# Patient Record
Sex: MALE | URBAN - METROPOLITAN AREA
[De-identification: names, ages, dates, MRNs, and addresses within clinical notes are randomized per-mention and may not be internally consistent; named-entity substitution may affect disease eponyms.]

---

## 2022-12-27 ENCOUNTER — OFFICE VISIT (OUTPATIENT)
Dept: URBAN - METROPOLITAN AREA CLINIC 72 | Facility: CLINIC | Age: 62
End: 2022-12-27

## 2022-12-27 NOTE — HPI-TODAY'S VISIT:
Mr. Graham is a 62-year-old male returns for follow-up for evaluation of posthospitalization was hospitalized in September with "GI bleed" evidently was on Xarelto for atrial fibrillation.  He also has a history of cirrhosis presumably related to alcohol use.  Colonoscopy was performed by me which did show some mild ulceration which a clip was placed and no further bleeding occurred.